# Patient Record
Sex: FEMALE | Race: BLACK OR AFRICAN AMERICAN | Employment: UNEMPLOYED | URBAN - METROPOLITAN AREA
[De-identification: names, ages, dates, MRNs, and addresses within clinical notes are randomized per-mention and may not be internally consistent; named-entity substitution may affect disease eponyms.]

---

## 2023-12-12 ENCOUNTER — APPOINTMENT (OUTPATIENT)
Dept: CT IMAGING | Age: 38
End: 2023-12-12
Payer: MEDICAID

## 2023-12-12 ENCOUNTER — APPOINTMENT (OUTPATIENT)
Dept: GENERAL RADIOLOGY | Age: 38
End: 2023-12-12
Payer: MEDICAID

## 2023-12-12 ENCOUNTER — HOSPITAL ENCOUNTER (EMERGENCY)
Age: 38
Discharge: HOME | End: 2023-12-12
Attending: STUDENT IN AN ORGANIZED HEALTH CARE EDUCATION/TRAINING PROGRAM
Payer: MEDICAID

## 2023-12-12 VITALS
HEART RATE: 71 BPM | DIASTOLIC BLOOD PRESSURE: 63 MMHG | BODY MASS INDEX: 33.45 KG/M2 | OXYGEN SATURATION: 96 % | HEIGHT: 66 IN | RESPIRATION RATE: 16 BRPM | WEIGHT: 208.11 LBS | SYSTOLIC BLOOD PRESSURE: 110 MMHG | TEMPERATURE: 97.3 F

## 2023-12-12 DIAGNOSIS — R10.9 ABDOMINAL PAIN, UNSPECIFIED ABDOMINAL LOCATION: Primary | ICD-10-CM

## 2023-12-12 DIAGNOSIS — R11.2 NAUSEA AND VOMITING, UNSPECIFIED VOMITING TYPE: ICD-10-CM

## 2023-12-12 DIAGNOSIS — N17.9 AKI (ACUTE KIDNEY INJURY) (HCC): ICD-10-CM

## 2023-12-12 DIAGNOSIS — K52.9 COLITIS: ICD-10-CM

## 2023-12-12 LAB
ALBUMIN SERPL-MCNC: 4.5 G/DL (ref 3.4–5)
ALP SERPL-CCNC: 108 U/L (ref 40–129)
ALT SERPL-CCNC: 18 U/L (ref 10–40)
ANION GAP SERPL CALCULATED.3IONS-SCNC: 11 MMOL/L (ref 3–16)
AST SERPL-CCNC: 19 U/L (ref 15–37)
BACTERIA URNS QL MICRO: ABNORMAL /HPF
BASOPHILS # BLD: 0 K/UL (ref 0–0.2)
BASOPHILS NFR BLD: 0.4 %
BILIRUB DIRECT SERPL-MCNC: <0.2 MG/DL (ref 0–0.3)
BILIRUB INDIRECT SERPL-MCNC: NORMAL MG/DL (ref 0–1)
BILIRUB SERPL-MCNC: 0.3 MG/DL (ref 0–1)
BILIRUB UR QL STRIP.AUTO: NEGATIVE
BUN SERPL-MCNC: 11 MG/DL (ref 7–20)
CALCIUM SERPL-MCNC: 9.5 MG/DL (ref 8.3–10.6)
CHLORIDE SERPL-SCNC: 103 MMOL/L (ref 99–110)
CLARITY UR: ABNORMAL
CO2 SERPL-SCNC: 29 MMOL/L (ref 21–32)
COLOR UR: YELLOW
CREAT SERPL-MCNC: 1.2 MG/DL (ref 0.6–1.1)
DEPRECATED RDW RBC AUTO: 15.2 % (ref 12.4–15.4)
EKG ATRIAL RATE: 59 BPM
EKG DIAGNOSIS: NORMAL
EKG P AXIS: 58 DEGREES
EKG P-R INTERVAL: 124 MS
EKG Q-T INTERVAL: 440 MS
EKG QRS DURATION: 78 MS
EKG QTC CALCULATION (BAZETT): 435 MS
EKG R AXIS: 65 DEGREES
EKG T AXIS: 27 DEGREES
EKG VENTRICULAR RATE: 59 BPM
EOSINOPHIL # BLD: 0 K/UL (ref 0–0.6)
EOSINOPHIL NFR BLD: 0.6 %
EPI CELLS #/AREA URNS AUTO: 3 /HPF (ref 0–5)
GFR SERPLBLD CREATININE-BSD FMLA CKD-EPI: 59 ML/MIN/{1.73_M2}
GLUCOSE SERPL-MCNC: 144 MG/DL (ref 70–99)
GLUCOSE UR STRIP.AUTO-MCNC: NEGATIVE MG/DL
HCG SERPL QL: NEGATIVE
HCT VFR BLD AUTO: 43.1 % (ref 36–48)
HGB BLD-MCNC: 14.8 G/DL (ref 12–16)
HGB UR QL STRIP.AUTO: NEGATIVE
HYALINE CASTS #/AREA URNS AUTO: 0 /LPF (ref 0–8)
KETONES UR STRIP.AUTO-MCNC: 15 MG/DL
LEUKOCYTE ESTERASE UR QL STRIP.AUTO: NEGATIVE
LIPASE SERPL-CCNC: 19 U/L (ref 13–60)
LYMPHOCYTES # BLD: 1 K/UL (ref 1–5.1)
LYMPHOCYTES NFR BLD: 14.6 %
MCH RBC QN AUTO: 33.8 PG (ref 26–34)
MCHC RBC AUTO-ENTMCNC: 34.3 G/DL (ref 31–36)
MCV RBC AUTO: 98.5 FL (ref 80–100)
MONOCYTES # BLD: 0.3 K/UL (ref 0–1.3)
MONOCYTES NFR BLD: 4.2 %
NEUTROPHILS # BLD: 5.6 K/UL (ref 1.7–7.7)
NEUTROPHILS NFR BLD: 80.2 %
NITRITE UR QL STRIP.AUTO: NEGATIVE
PH UR STRIP.AUTO: 7.5 [PH] (ref 5–8)
PLATELET # BLD AUTO: 227 K/UL (ref 135–450)
PMV BLD AUTO: 8.4 FL (ref 5–10.5)
POTASSIUM SERPL-SCNC: 3.8 MMOL/L (ref 3.5–5.1)
PROT SERPL-MCNC: 7.2 G/DL (ref 6.4–8.2)
PROT UR STRIP.AUTO-MCNC: 30 MG/DL
RBC # BLD AUTO: 4.38 M/UL (ref 4–5.2)
RBC CLUMPS #/AREA URNS AUTO: 5 /HPF (ref 0–4)
SODIUM SERPL-SCNC: 143 MMOL/L (ref 136–145)
SP GR UR STRIP.AUTO: 1.03 (ref 1–1.03)
TROPONIN, HIGH SENSITIVITY: 8 NG/L (ref 0–14)
UA COMPLETE W REFLEX CULTURE PNL UR: ABNORMAL
UA DIPSTICK W REFLEX MICRO PNL UR: YES
URN SPEC COLLECT METH UR: ABNORMAL
UROBILINOGEN UR STRIP-ACNC: 1 E.U./DL
WBC # BLD AUTO: 7 K/UL (ref 4–11)
WBC #/AREA URNS AUTO: 1 /HPF (ref 0–5)

## 2023-12-12 PROCEDURE — 83690 ASSAY OF LIPASE: CPT

## 2023-12-12 PROCEDURE — 85025 COMPLETE CBC W/AUTO DIFF WBC: CPT

## 2023-12-12 PROCEDURE — 96374 THER/PROPH/DIAG INJ IV PUSH: CPT

## 2023-12-12 PROCEDURE — 6360000004 HC RX CONTRAST MEDICATION: Performed by: STUDENT IN AN ORGANIZED HEALTH CARE EDUCATION/TRAINING PROGRAM

## 2023-12-12 PROCEDURE — 6360000002 HC RX W HCPCS: Performed by: STUDENT IN AN ORGANIZED HEALTH CARE EDUCATION/TRAINING PROGRAM

## 2023-12-12 PROCEDURE — 74177 CT ABD & PELVIS W/CONTRAST: CPT

## 2023-12-12 PROCEDURE — 93010 ELECTROCARDIOGRAM REPORT: CPT | Performed by: INTERNAL MEDICINE

## 2023-12-12 PROCEDURE — 96361 HYDRATE IV INFUSION ADD-ON: CPT

## 2023-12-12 PROCEDURE — 2580000003 HC RX 258: Performed by: STUDENT IN AN ORGANIZED HEALTH CARE EDUCATION/TRAINING PROGRAM

## 2023-12-12 PROCEDURE — 81001 URINALYSIS AUTO W/SCOPE: CPT

## 2023-12-12 PROCEDURE — 71045 X-RAY EXAM CHEST 1 VIEW: CPT

## 2023-12-12 PROCEDURE — 80048 BASIC METABOLIC PNL TOTAL CA: CPT

## 2023-12-12 PROCEDURE — 84484 ASSAY OF TROPONIN QUANT: CPT

## 2023-12-12 PROCEDURE — 84703 CHORIONIC GONADOTROPIN ASSAY: CPT

## 2023-12-12 PROCEDURE — 80076 HEPATIC FUNCTION PANEL: CPT

## 2023-12-12 PROCEDURE — 96375 TX/PRO/DX INJ NEW DRUG ADDON: CPT

## 2023-12-12 PROCEDURE — 93005 ELECTROCARDIOGRAM TRACING: CPT | Performed by: STUDENT IN AN ORGANIZED HEALTH CARE EDUCATION/TRAINING PROGRAM

## 2023-12-12 PROCEDURE — 99285 EMERGENCY DEPT VISIT HI MDM: CPT

## 2023-12-12 RX ORDER — PROMETHAZINE HYDROCHLORIDE 25 MG/1
25 SUPPOSITORY RECTAL EVERY 6 HOURS PRN
Qty: 7 SUPPOSITORY | Refills: 0 | Status: SHIPPED | OUTPATIENT
Start: 2023-12-12 | End: 2023-12-19

## 2023-12-12 RX ORDER — ONDANSETRON 2 MG/ML
4 INJECTION INTRAMUSCULAR; INTRAVENOUS ONCE
Status: COMPLETED | OUTPATIENT
Start: 2023-12-12 | End: 2023-12-12

## 2023-12-12 RX ORDER — DICYCLOMINE HYDROCHLORIDE 10 MG/1
10 CAPSULE ORAL 4 TIMES DAILY
Qty: 120 CAPSULE | Refills: 0 | Status: SHIPPED | OUTPATIENT
Start: 2023-12-12

## 2023-12-12 RX ORDER — 0.9 % SODIUM CHLORIDE 0.9 %
1000 INTRAVENOUS SOLUTION INTRAVENOUS ONCE
Status: COMPLETED | OUTPATIENT
Start: 2023-12-12 | End: 2023-12-12

## 2023-12-12 RX ORDER — KETOROLAC TROMETHAMINE 15 MG/ML
15 INJECTION, SOLUTION INTRAMUSCULAR; INTRAVENOUS ONCE
Status: COMPLETED | OUTPATIENT
Start: 2023-12-12 | End: 2023-12-12

## 2023-12-12 RX ORDER — DROPERIDOL 2.5 MG/ML
2.5 INJECTION, SOLUTION INTRAMUSCULAR; INTRAVENOUS ONCE
Status: COMPLETED | OUTPATIENT
Start: 2023-12-12 | End: 2023-12-12

## 2023-12-12 RX ORDER — ONDANSETRON 4 MG/1
4 TABLET, ORALLY DISINTEGRATING ORAL 3 TIMES DAILY PRN
Qty: 21 TABLET | Refills: 0 | Status: SHIPPED | OUTPATIENT
Start: 2023-12-12

## 2023-12-12 RX ADMIN — KETOROLAC TROMETHAMINE 15 MG: 15 INJECTION, SOLUTION INTRAMUSCULAR; INTRAVENOUS at 08:14

## 2023-12-12 RX ADMIN — DROPERIDOL 2.5 MG: 2.5 INJECTION, SOLUTION INTRAMUSCULAR; INTRAVENOUS at 08:26

## 2023-12-12 RX ADMIN — SODIUM CHLORIDE 1000 ML: 9 INJECTION, SOLUTION INTRAVENOUS at 08:26

## 2023-12-12 RX ADMIN — IOPAMIDOL 75 ML: 755 INJECTION, SOLUTION INTRAVENOUS at 09:09

## 2023-12-12 RX ADMIN — SODIUM CHLORIDE 1000 ML: 9 INJECTION, SOLUTION INTRAVENOUS at 10:30

## 2023-12-12 RX ADMIN — ONDANSETRON 4 MG: 2 INJECTION INTRAMUSCULAR; INTRAVENOUS at 08:14

## 2023-12-12 NOTE — ED NOTES
Pt ambulated to bathroom with steady gait to provide urine sample.      Nikko Vasquez RN  12/12/23 6719

## 2023-12-12 NOTE — ED NOTES
Pt bundled and reminded to keep arm straight to allow fluids to flow. Call light within reach.      Rodney Gracia RN  12/12/23 1435

## 2023-12-12 NOTE — ED NOTES
Pt back from bathroom and on cardiac monitor. Urine sent to lab for analysis. Call light within reach.      Abdullahi Nguyen RN  12/12/23 2161

## 2023-12-12 NOTE — ED NOTES
Retching violently during triage, states is not in the mood to answer any additional questions at this time. Pt fingers wet from self-induced vomiting.      Travon Locke RN  12/12/23 1194

## 2023-12-12 NOTE — DISCHARGE INSTRUCTIONS
Take your medications as prescribed. Follow-up with your family doctor if you do not have 1 the referral has been given to you. Follow-up with the GI specialist given to you as well.   Return if you develop any new or worsening symptoms

## 2023-12-12 NOTE — ED NOTES
Discharge and education instructions reviewed with patient. Teach-back successful. Pt verbalized understanding and denied questions at this time. No acute distress noted. Patient instructed to follow-up as noted - return to emergency department if symptoms worsen. Patient verbalized understanding. Discharged per EDMD with discharge instructions. Pt discharged to private vehicle. Patient stable upon departure.       Marko Cedillo RN  12/12/23 1257

## 2023-12-12 NOTE — ED NOTES
Pt bundled and lights dimmed for comfort. Call light within reach.      Billy Leon RN  12/12/23 7291

## 2024-02-09 ENCOUNTER — ANESTHESIA EVENT (OUTPATIENT)
Dept: ENDOSCOPY | Age: 39
End: 2024-02-09
Payer: MEDICAID

## 2024-02-12 ENCOUNTER — ANESTHESIA (OUTPATIENT)
Dept: ENDOSCOPY | Age: 39
End: 2024-02-12
Payer: MEDICAID

## 2024-02-12 ENCOUNTER — HOSPITAL ENCOUNTER (OUTPATIENT)
Age: 39
Setting detail: OUTPATIENT SURGERY
Discharge: HOME | End: 2024-02-12
Attending: INTERNAL MEDICINE | Admitting: INTERNAL MEDICINE
Payer: MEDICAID

## 2024-02-12 VITALS
OXYGEN SATURATION: 100 % | HEIGHT: 66 IN | WEIGHT: 194.2 LBS | DIASTOLIC BLOOD PRESSURE: 78 MMHG | TEMPERATURE: 97 F | RESPIRATION RATE: 14 BRPM | HEART RATE: 53 BPM | BODY MASS INDEX: 31.21 KG/M2 | SYSTOLIC BLOOD PRESSURE: 120 MMHG

## 2024-02-12 DIAGNOSIS — R11.2 NAUSEA AND VOMITING IN ADULT: ICD-10-CM

## 2024-02-12 DIAGNOSIS — R10.13 EPIGASTRIC PAIN: ICD-10-CM

## 2024-02-12 PROBLEM — K21.9 GERD (GASTROESOPHAGEAL REFLUX DISEASE): Status: ACTIVE | Noted: 2024-02-12

## 2024-02-12 PROBLEM — R63.4 WEIGHT LOSS: Status: ACTIVE | Noted: 2024-02-12

## 2024-02-12 PROBLEM — K92.0 HEMATEMESIS: Status: ACTIVE | Noted: 2024-02-12

## 2024-02-12 PROCEDURE — 7100000010 HC PHASE II RECOVERY - FIRST 15 MIN: Performed by: INTERNAL MEDICINE

## 2024-02-12 PROCEDURE — 3609012400 HC EGD TRANSORAL BIOPSY SINGLE/MULTIPLE: Performed by: INTERNAL MEDICINE

## 2024-02-12 PROCEDURE — 2500000003 HC RX 250 WO HCPCS: Performed by: NURSE ANESTHETIST, CERTIFIED REGISTERED

## 2024-02-12 PROCEDURE — 88305 TISSUE EXAM BY PATHOLOGIST: CPT

## 2024-02-12 PROCEDURE — 2580000003 HC RX 258: Performed by: ANESTHESIOLOGY

## 2024-02-12 PROCEDURE — 7100000011 HC PHASE II RECOVERY - ADDTL 15 MIN: Performed by: INTERNAL MEDICINE

## 2024-02-12 PROCEDURE — 3700000001 HC ADD 15 MINUTES (ANESTHESIA): Performed by: INTERNAL MEDICINE

## 2024-02-12 PROCEDURE — 6360000002 HC RX W HCPCS: Performed by: NURSE ANESTHETIST, CERTIFIED REGISTERED

## 2024-02-12 PROCEDURE — 2709999900 HC NON-CHARGEABLE SUPPLY: Performed by: INTERNAL MEDICINE

## 2024-02-12 PROCEDURE — 3700000000 HC ANESTHESIA ATTENDED CARE: Performed by: INTERNAL MEDICINE

## 2024-02-12 RX ORDER — DIPHENHYDRAMINE HYDROCHLORIDE 50 MG/ML
12.5 INJECTION INTRAMUSCULAR; INTRAVENOUS
Status: CANCELLED | OUTPATIENT
Start: 2024-02-12 | End: 2024-02-13

## 2024-02-12 RX ORDER — LIDOCAINE HYDROCHLORIDE 20 MG/ML
INJECTION, SOLUTION EPIDURAL; INFILTRATION; INTRACAUDAL; PERINEURAL PRN
Status: DISCONTINUED | OUTPATIENT
Start: 2024-02-12 | End: 2024-02-12 | Stop reason: SDUPTHER

## 2024-02-12 RX ORDER — PROPOFOL 10 MG/ML
INJECTION, EMULSION INTRAVENOUS PRN
Status: DISCONTINUED | OUTPATIENT
Start: 2024-02-12 | End: 2024-02-12 | Stop reason: SDUPTHER

## 2024-02-12 RX ORDER — SODIUM CHLORIDE 0.9 % (FLUSH) 0.9 %
5-40 SYRINGE (ML) INJECTION PRN
Status: DISCONTINUED | OUTPATIENT
Start: 2024-02-12 | End: 2024-02-12 | Stop reason: HOSPADM

## 2024-02-12 RX ORDER — SODIUM CHLORIDE 9 MG/ML
INJECTION, SOLUTION INTRAVENOUS PRN
Status: CANCELLED | OUTPATIENT
Start: 2024-02-12

## 2024-02-12 RX ORDER — SODIUM CHLORIDE 9 MG/ML
INJECTION, SOLUTION INTRAVENOUS PRN
Status: DISCONTINUED | OUTPATIENT
Start: 2024-02-12 | End: 2024-02-12 | Stop reason: HOSPADM

## 2024-02-12 RX ORDER — SODIUM CHLORIDE 0.9 % (FLUSH) 0.9 %
5-40 SYRINGE (ML) INJECTION EVERY 12 HOURS SCHEDULED
Status: DISCONTINUED | OUTPATIENT
Start: 2024-02-12 | End: 2024-02-12 | Stop reason: HOSPADM

## 2024-02-12 RX ORDER — SODIUM CHLORIDE 0.9 % (FLUSH) 0.9 %
5-40 SYRINGE (ML) INJECTION EVERY 12 HOURS SCHEDULED
Status: CANCELLED | OUTPATIENT
Start: 2024-02-12

## 2024-02-12 RX ORDER — SODIUM CHLORIDE 0.9 % (FLUSH) 0.9 %
5-40 SYRINGE (ML) INJECTION PRN
Status: CANCELLED | OUTPATIENT
Start: 2024-02-12

## 2024-02-12 RX ORDER — ONDANSETRON 2 MG/ML
4 INJECTION INTRAMUSCULAR; INTRAVENOUS
Status: CANCELLED | OUTPATIENT
Start: 2024-02-12 | End: 2024-02-13

## 2024-02-12 RX ADMIN — PROPOFOL 150 MCG/KG/MIN: 10 INJECTION, EMULSION INTRAVENOUS at 09:31

## 2024-02-12 RX ADMIN — SODIUM CHLORIDE: 9 INJECTION, SOLUTION INTRAVENOUS at 08:09

## 2024-02-12 RX ADMIN — PROPOFOL 150 MG: 10 INJECTION, EMULSION INTRAVENOUS at 09:30

## 2024-02-12 RX ADMIN — LIDOCAINE HYDROCHLORIDE 50 MG: 20 INJECTION, SOLUTION EPIDURAL; INFILTRATION; INTRACAUDAL; PERINEURAL at 09:30

## 2024-02-12 NOTE — ANESTHESIA PRE PROCEDURE
Department of Anesthesiology  Preprocedure Note       Name:  Sandra Abel   Age:  38 y.o.  :  1985                                          MRN:  [id number]         Date:  2024      Surgeon: Surgeon(s):  Sridhar Longoria MD    Procedure: Procedure(s):  EGD ESOPHAGOGASTRODUODENOSCOPY    Medications prior to admission:   Prior to Admission medications    Medication Sig Start Date End Date Taking? Authorizing Provider   vitamin D 25 MCG (1000 UT) CAPS Take 25 capsules by mouth 2 times daily   Yes Provider, MD Enrique   ondansetron (ZOFRAN-ODT) 4 MG disintegrating tablet Take 1 tablet by mouth 3 times daily as needed for Nausea or Vomiting 23   Blas Dickens MD   dicyclomine (BENTYL) 10 MG capsule Take 1 capsule by mouth 4 times daily 23   Blas Dickens MD       Current medications:    Current Facility-Administered Medications   Medication Dose Route Frequency Provider Last Rate Last Admin    sodium chloride flush 0.9 % injection 5-40 mL  5-40 mL IntraVENous 2 times per day Inocente Montoya MD        sodium chloride flush 0.9 % injection 5-40 mL  5-40 mL IntraVENous PRN Inocente Montoya MD        0.9 % sodium chloride infusion   IntraVENous PRN Inocente Montoya  mL/hr at 24 0847 NoRateChange at 24 0847       Allergies:  No Known Allergies    Problem List:    Patient Active Problem List   Diagnosis Code    Nausea and vomiting in adult R11.2    Epigastric pain R10.13    Weight loss R63.4    GERD (gastroesophageal reflux disease) K21.9       Past Medical History:        Diagnosis Date    Anxiety     Blastoma (HCC)     Hypertension     Prosthetic eye globe     Right prosthetic eye    PTSD (post-traumatic stress disorder)        Past Surgical History:  No past surgical history on file.    Social History:    Social History     Tobacco Use    Smoking status: Not on file    Smokeless tobacco: Not on file   Substance Use Topics    Alcohol use: Not on file

## 2024-02-12 NOTE — H&P
Gastroenterology Note             Pre-operative History and Physical    Patient: Sandra Abel  : 1985  CSN: [id number]    History Obtained From:  patient and/or guardian.     HISTORY OF PRESENT ILLNESS:    The patient is a 38 y.o. female  here for epig pain, N/V, wt loss, GERD.      Past Medical History:    No past medical history on file.  Past Surgical History:    No past surgical history on file.  Medications Prior to Admission:   No current facility-administered medications on file prior to encounter.     Current Outpatient Medications on File Prior to Encounter   Medication Sig Dispense Refill    vitamin D 25 MCG (1000 UT) CAPS Take 25 capsules by mouth 2 times daily      ondansetron (ZOFRAN-ODT) 4 MG disintegrating tablet Take 1 tablet by mouth 3 times daily as needed for Nausea or Vomiting 21 tablet 0    dicyclomine (BENTYL) 10 MG capsule Take 1 capsule by mouth 4 times daily 120 capsule 0        Allergies:  Patient has no known allergies.      Social History:   Social History     Tobacco Use    Smoking status: Not on file    Smokeless tobacco: Not on file   Substance Use Topics    Alcohol use: Not on file     Family History:   No family history on file.    PHYSICAL EXAM:      There were no vitals taken for this visit. I        Heart:   within normal limits    Lungs:  CTA bilat anteriorly,  Normal effort    Abdomen:   soft, NT, ND      ASA Grade:  ASA 2 - Patient with mild systemic disease with no functional limitations    Mallampati Class: 2      ASSESSMENT AND PLAN:    1.  Patient is a 38 y.o. female here for EGD.   2.  Procedure options, risks and benefits reviewed with patient.  Patient   expresses understanding and wishes to proceed.    Sridhar Longoria MD,   [facility]  2024    Please note that some or all of this record was generated using voice recognition software. If there are any questions about the content of this document, please contact the author as some errors in

## 2024-02-12 NOTE — ANESTHESIA POSTPROCEDURE EVALUATION
Department of Anesthesiology  Postprocedure Note    Patient: Sandra Abel  MRN: [id number]  YOB: 1985  Date of evaluation: 2/12/2024    Procedure Summary       Date: 02/12/24 Room / Location: [place] [address] / [facility]    Anesthesia Start: 0926 Anesthesia Stop: 0936    Procedure: EGD BIOPSY Diagnosis:       Nausea and vomiting in adult      Epigastric pain      (Nausea and vomiting in adult [R11.2])      (Epigastric pain [R10.13])    Surgeons: Sridhar Longoria MD Responsible Provider: Adiel Mathis MD    Anesthesia Type: MAC ASA Status: 3            Anesthesia Type: MAC    Kevin Phase I: Kevin Score: 10    Kevin Phase II: Kevin Score: 10    Anesthesia Post Evaluation    Patient location during evaluation: bedside  Patient participation: complete - patient participated  Level of consciousness: awake and alert  Pain score: 0  Nausea & Vomiting: no nausea  Cardiovascular status: hemodynamically stable  Respiratory status: acceptable  Hydration status: stable  Pain management: adequate    No notable events documented.

## 2024-02-12 NOTE — DISCHARGE INSTRUCTIONS
Endoscopy Discharge Instructions    Call @[facility]@ with any questions or concerns.    You may be drowsy or lightheaded after receiving sedation.  DO NOT operate  a vehicle (automobile, bicycle, motorcycle, machinery, or power tools), no  alcoholic beverages, and do not make any important decisions today.                 Plan on bed rest or quiet relaxation today.  Resume normal activities in the morning.     Resume normal activity tomorrow unless otherwise advised by your physician.            Eat a light first meal, avoiding spicy and fatty foods, then resume normal diet unless  you are told otherwise by your physician.    If the intravenous medication site is painful, apply warm compresses on the site until the soreness is relieved and elevate the arm above the heart. Call your physician if no improvement  in 2-3 days.       POSSIBLE SYMPTOMS TO WATCH:     1. fever (greater than 100) 5. increased abdominal bloating   2. severe pain   6. excessive bleeding   3. nausea and vomiting  7. chest pain   4. chills    8. shortness of breath       Notify @[facility] if these problems occur     Expected as normal and remedies:  Sore throat: use over the counter throat lozenges or gargle with warm salt water.  Redness or soreness at the IV site: apply warm compress  Gaseous discomfort: belching or passing flatus (gas).    Call for biopsy results if they have not been posted to your portal within 1 week.  Proton pump inhibitor therapy daily.  Avoid NSAIDs, marijuana and alcohol.    Sridhar Longoria MD    With questions or concerns call Dr Longoria at .

## 2024-02-12 NOTE — OP NOTE
Endoscopy Note    Patient: Sandra Abel  : 1985  CSN: [id number]    Procedure: Esophagogastroduodenoscopy with biopsy    Date:  2024     Surgeon:  Sridhar Longoria MD     Referring Physician:  No primary care provider on file.    Preoperative Diagnosis:  Nausea and vomiting in adult [R11.2]  Epigastric pain [R10.13]    Postoperative Diagnosis:  * No post-op diagnosis entered *    Anesthesia:  MAC    EBL: minimal to none.    Indications: This is a 38 y.o. year old female who presents today with  PERRL, conjunctiva normal, vomiting, hematemesis, upper abdominal pain, weight loss .    Description of Procedure:  Informed consent was obtained from the patient after explanation of indications, benefits and possible risks and complications of the procedure.  The patient was then taken to the endoscopy suite, placed in the left lateral decubitus position and the above IV sedation was administrered.    The Olympus video endoscope was passed through the hypopharynx into the esophagus. The scope was advanced all the way to the second portion of the duodenum. The scope was slowly withdrawn and mucosa was carefully evaluated including a retroflex view of the proximal stomach.  Findings and interventions are described below.  The patient was decompressed and the scope was then withdrawn.    Gastric or Duodenal ulcer present: No    The patient tolerated the procedure well and was taken to the post anesthesia care unit in good condition.  There were no immediate complications.      Impression:  -  1.  Small sliding-type hiatal hernia in the distal esophagus.  Esophageal mucosa is normal.  2.  Mild antritis with linear erythema.  Biopsies taken in the antrum and fundus of the stomach to rule out H. pylori using the Alyson protocol.  3.  Normal duodenum but biopsies taken in the bulb and second portion to rule out celiac disease.      Recommendations: -Follow-up on biopsy results.  Proton pump inhibitor therapy daily.

## 2025-03-24 ENCOUNTER — HOSPITAL ENCOUNTER (OUTPATIENT)
Dept: MAMMOGRAPHY | Age: 40
Discharge: HOME | End: 2025-03-29
Payer: COMMERCIAL

## 2025-03-24 VITALS — WEIGHT: 190 LBS | HEIGHT: 66 IN | BODY MASS INDEX: 30.53 KG/M2

## 2025-03-24 DIAGNOSIS — Z12.31 VISIT FOR SCREENING MAMMOGRAM: ICD-10-CM

## 2025-03-24 PROCEDURE — 77063 BREAST TOMOSYNTHESIS BI: CPT

## (undated) DEVICE — FORCEPS BX 240CM 2.4MM L NDL RAD JAW 4 M00513334